# Patient Record
Sex: MALE | Race: BLACK OR AFRICAN AMERICAN
[De-identification: names, ages, dates, MRNs, and addresses within clinical notes are randomized per-mention and may not be internally consistent; named-entity substitution may affect disease eponyms.]

---

## 2018-10-25 ENCOUNTER — HOSPITAL ENCOUNTER (EMERGENCY)
Dept: HOSPITAL 92 - ERS | Age: 33
Discharge: HOME | End: 2018-10-25
Payer: COMMERCIAL

## 2018-10-25 DIAGNOSIS — F17.210: ICD-10-CM

## 2018-10-25 DIAGNOSIS — I10: ICD-10-CM

## 2018-10-25 DIAGNOSIS — M48.02: Primary | ICD-10-CM
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
## 2018-10-25 DIAGNOSIS — S00.83XA: ICD-10-CM

## 2018-10-25 DIAGNOSIS — S01.81XA: ICD-10-CM

## 2018-10-25 DIAGNOSIS — Z79.899: ICD-10-CM

## 2018-10-25 PROCEDURE — 90471 IMMUNIZATION ADMIN: CPT

## 2018-10-25 PROCEDURE — 71046 X-RAY EXAM CHEST 2 VIEWS: CPT

## 2018-10-25 PROCEDURE — 72141 MRI NECK SPINE W/O DYE: CPT

## 2018-10-25 PROCEDURE — 90715 TDAP VACCINE 7 YRS/> IM: CPT

## 2018-10-25 PROCEDURE — G0390 TRAUMA RESPONS W/HOSP CRITI: HCPCS

## 2018-10-25 PROCEDURE — 93005 ELECTROCARDIOGRAM TRACING: CPT

## 2018-10-25 NOTE — RAD
LEFT RIB SERIES 3 VIEWS:

 

Date:  10/25/18 

 

PROVIDED CLINICAL HISTORY:   

Chest pain status post injury. 

 

FINDINGS:

No evidence for a displaced left-sided rib fracture, pleural fluid, or pneumothorax. 

 

IMPRESSION: 

As above. 

 

 

POS: [facility]

## 2018-10-25 NOTE — MRI
PRELIMINARY REPORT/[facility]/EMERGENTY AFTER-HOURS PROCEDURE 

 

Addendum created by Prem Weber MD on 10/25/2018 2:47 AM Central Time ([place] & [place]) Findings were di
scussed with Ra Soriano at 10/25/2018 2:47 AM CDT.

Initial Report created on 10/25/2018 2:39 AM Central Time ([place] & [place])

 

MR Cervical Spine Without Intravenous Contrast

 

EXAM DATE/TIME:

Exam ordered 10/25/2018 1:50 AM

 

CLINICAL HISTORY:

32 years old, male; Pain; Neck pain; Patient HX: Neck and bilateral arm pain, no surgery, no prev, Fe
ll

 

TECHNIQUE:

Magnetic resonance images of the cervical spine without intravenous contrast in multiple planes.

 

COMPARISON:

No relevant prior studies available.

 

FINDINGS:

Vertebrae: Normal. No acute fracture.

Spinal cord: Normal. Normal signal.

Soft tissues: Normal.

Vasculature: Normal. Normal vertebral artery flow voids are visualized.

 

DISCS/SPINAL CANAL/NEURAL FORAMINA:

C2-C3: Normal. No significant disc disease. No stenosis.

C3-C4: Normal. No significant disc disease. No stenosis.

C4-C5: The C4-C5 there is a mild disc osteophyte complex which mildly narrows the ventral thecal yuliana
l resulting in mild stenosis.

C5-C6: At C5-C6 there is a moderate disc osteophyte complex indenting the thecal sac and spinal cord 
resulting in moderate spinal stenosis.

C6-C7: Normal. No significant disc disease. No stenosis.

C7-T1: Normal. No significant disc disease. No stenosis.

 

IMPRESSION:

Moderate spinal stenosis at C5-C6 on the basis of disc osteophyte complex.

 

Thank you for allowing us to participate in the care of your patient.

Dictated and Authenticated by: Prem Weber MD

10/25/2018 2:39 AM Central Time ([place] & [place])

 

 

FINAL REPORT:

MRI CERVICAL SPINE:

 

HISTORY:

Neck and arm pain.

 

TECHNIQUE:

Multiplanar, multisequence, noncontrast enhanced MRI of the cervical spine obtained.

 

FINDINGS:

The images demonstrate some compression of the thecal sac at the C5-C6 and C6-C7 levels, due to broad
-based disk bulge, as this was a congenitally narrowed spinal canal.

 

C1-C2:  Unremarkable.

 

C2-C3:  Unremarkable.

 

C3-C4:  There is a mild broad-based disk bulge.  No significant evidence of thecal sac compression se
en.  There is moderate to severe bilateral C3-C4 neural foraminal narrowing due to uncovertebral oste
ophyte hypertrophy.

 

C4-C5:  Disk desiccation is seen.  There is a broad-based disk osteophyte complex centrally, compress
ing the thecal sac, resulting in mild to moderate compression of the thecal sac.  There is moderate r
ight and moderate to severe left C4-C5 neural foraminal narrowing due to uncovertebral osteophyte hyp
ertrophy.

 

C5-C6:  There is a large broad-based central disk osteophyte complex compressing the thecal sac, resu
lting in moderate to severe thecal sac and moderate cord compression.  There is moderate bilateral ne
ural foraminal narrowing due to uncovertebral osteophyte hypertrophy.

 

C6-C7:  There is a small right paracentral C6-C7 disk protrusion, minimally but not significantly com
pressing the thecal sac.  The neural foramen are patent.

 

C7-T1:  Unremarkable.

 

IMPRESSION:

Central and neural foraminal narrowing in the mid to lower cervical spine, as described above.

 

POS: [facility]

## 2018-10-25 NOTE — RAD
2 VIEWS CHEST:

 

Date:  10/25/18 

 

PROVIDED CLINICAL HISTORY:   

Pain status post injury. 

 

FINDINGS:

Cardiac and mediastinal silhouette is within normal limits. Lungs appear clear. No pleural fluid or p
neumothorax apparent. 

 

IMPRESSION: 

No evidence of acute cardiopulmonary process. 

 

 

POS: [facility]

## 2018-10-27 NOTE — EKG
Test Reason : TRAUMA

Blood Pressure : ***/*** mmHG

Vent. Rate : 075 BPM     Atrial Rate : 075 BPM

   P-R Int : 128 ms          QRS Dur : 086 ms

    QT Int : 392 ms       P-R-T Axes : 082 074 070 degrees

   QTc Int : 437 ms

 

Normal sinus rhythm with sinus arrhythmia

Biatrial enlargement

Pulmonary disease pattern

Abnormal ECG

 

Confirmed by SOLOMON OBANDO DO ([id number]),  TAMIE TERRY ([id number]) on 10/27/2018 11:35:39 AM

 

Referred By:             Confirmed By:SOLOMON OBANDO DO